# Patient Record
(demographics unavailable — no encounter records)

---

## 2024-10-23 NOTE — ASSESSMENT
[FreeTextEntry1] : Akash is a 6 year-old boy who sustained a left distal radius buckle fracture on 10/8. Today's assessment was performed with the assistance of the patient's parent as an independent historian as the patient's history is unreliable. The radi[place] obtained [place] [facility] [place]24 and the office were reviewed with both the parent and patient confirming a left distal radius buckle fracture. The recommendation at this time would be continuation of removable splint for another 2 weeks then may remove and slowly return to activity   We had a thorough talk in regard to the diagnosis, prognosis and treatment modalities. All questions and concerns were addressed today. There was a verbal understanding from the parents and patient. Patient may follow up in 2 weeks with plan to discontinue splint and plan for fu kirti   I Aleida Duran MD

## 2024-10-23 NOTE — REVIEW OF SYSTEMS
[Fever Above 102] : no fever [Redness] : no redness [Wheezing] : no wheezing [Cough] : no cough [Shortness of Breath] : no shortness of breath [Limping] : no limping [Joint Swelling] : no joint swelling

## 2024-10-23 NOTE — HISTORY OF PRESENT ILLNESS
[FreeTextEntry1] : Akash is a 6 year-old boy who presents for evaluation of left distal radius buckle fracture. He was climbing a tree and was 5 feet in the air when he fell onto his outstretched arm. [place] [facility] [place] where xrays were obtained and he was placed in a splint. Today patient is able to move wrist without pain. He has been playing without issue. Denies numbness or tingling. Denies any new trauma/falls. Still mild TTP at distal radius.

## 2024-10-23 NOTE — PHYSICAL EXAM
[FreeTextEntry1] : Physical Exam    Awake and alert appropriate for their age. The patient has the appropriate coordination and balance noted on the table today for their age.  LUE: removable splint in place on removal, skin intact, no swelling/erythema/ecchymosis  FROM of wrist without pain mild ttp at distal radius  Grossly Neurologically intact with sensation to palpation though exam somewhat limited due to patient age  2+ radial pulse  Cap refill less than 2 seconds compartments soft and compressible.

## 2024-10-23 NOTE — DATA REVIEWED
[de-identified] : My review and interpretation of the radiologic studies: Imaging from [facility] [place] on 10/8/24 reviewed XR of the wrist demonstrating distal radius buckle fracture   Imaging obtained today in the office on 10/22/24 demonstrates healing left distal radius buckle fracture.  Alignment is acceptable in all planes.  No osseous abnormalities.  Findings are within normal limits.

## 2024-10-23 NOTE — DATA REVIEWED
[de-identified] : My review and interpretation of the radiologic studies: Imaging from [facility] [place] on 10/8/24 reviewed XR of the wrist demonstrating distal radius buckle fracture   Imaging obtained today in the office on 10/22/24 demonstrates healing left distal radius buckle fracture.  Alignment is acceptable in all planes.  No osseous abnormalities.  Findings are within normal limits.

## 2024-10-23 NOTE — HISTORY OF PRESENT ILLNESS
[FreeTextEntry1] : Akash is a 6 year-old boy who presents for evaluation of left distal radius buckle fracture. He was climbing a tree and was 5 feet in the air when he fell onto his outstretched arm. He was seen at [facility] [place] on 10/8/24 where xrays were obtained and he was placed in a splint. Today patient is able to move wrist without pain. He has been playing without issue. Denies numbness or tingling. Denies any new trauma/falls. Still mild TTP at distal radius.

## 2024-10-23 NOTE — END OF VISIT
[] : Resident [FreeTextEntry3] : I, Andrae Guardado MD, personally saw and evaluated the patient and developed the plan as documented above. I concur or have edited the note as appropriate.

## 2024-10-23 NOTE — ASSESSMENT
[FreeTextEntry1] : Akash is a 6 year-old boy who sustained a left distal radius buckle fracture on 10/8. Today's assessment was performed with the assistance of the patient's parent as an independent historian as the patient's history is unreliable. The radiographs obtained from [facility] [place] on 10/8/24 and the office were reviewed with both the parent and patient confirming a left distal radius buckle fracture. The recommendation at this time would be continuation of removable splint for another 2 weeks then may remove and slowly return to activity   We had a thorough talk in regard to the diagnosis, prognosis and treatment modalities. All questions and concerns were addressed today. There was a verbal understanding from the parents and patient. Patient may follow up in 2 weeks with plan to discontinue splint and plan for fu kirti   I Aleida Duran MD

## 2024-11-11 NOTE — CONSULT LETTER
[Dear  ___] : Dear  [unfilled], [Consult Letter:] : I had the pleasure of evaluating your patient, [unfilled]. [Please see my note below.] : Please see my note below. [Consult Closing:] : Thank you very much for allowing me to participate in the care of this patient.  If you have any questions, please do not hesitate to contact me. [Sincerely,] : Sincerely, [FreeTextEntry3] : Gilberto Yan MD FAAP FACS Director, [facility] Division of Pediatric General, Thoracic and Endoscopic Surgery [facility]

## 2024-11-11 NOTE — HISTORY OF PRESENT ILLNESS
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 Pino. Post-operatively, he had two episodes of HAEC. He initially demonstrated good control with potty training, however, he began to have nocturnal fecal soiling. It was recommended that he proceed with weekly glycerin/low volume enemas and a daily dose of Senna. He presents today to evaluate his progress.

## 2024-12-16 NOTE — REVIEW OF SYSTEMS
This order has been ordered and printed I have also returned patient's call who states she is at the eye doctor and she will return my call later today.   [Negative] : Genitourinary

## 2024-12-24 NOTE — PHYSICAL EXAM
[NL] : grossly intact [Soft] : soft [Tender] : not tender [Distended] : not distended [TextBox_37] : No palpable stool [TextBox_59] : Rectal exam deferred

## 2024-12-24 NOTE — ASSESSMENT
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 Pino. He is doing well at home and his constipation is well-managed at home with daily Senna. He is keeping accident free during the day and will have an accident at night if he forgets to sit on the toilet. I counseled Akash and his father and reviewed the results of today's AXR, which demonstrated a moderate stool burden within the ascending colon and rectum. I reassured them that Akash is doing well and reviewed the importance of continuing to manage his constipation at home. We discussed good toileting habits for the family to try at home and also discussed the option of establishing care with the pelvic floor physical therapy team to further aid with his stooling techniques. Dad verbalized his understanding and is interested in this process. We provided him with the contact information required to schedule an appointment. Moving forward, I recommended the family remains compliant with the daily Senna and I explained to dad he can titrate the dosage upwards to 15 ml over time. He should also incorporate insoluble fiber into his diet and a high-water intake. Dad indicated his understanding and we have agreed to follow up in six months to check in on Akash's progress. They have my information and know to contact me sooner with any questions or concerns.

## 2024-12-24 NOTE — ASSESSMENT
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 Pino. He is doing well at home and his constipation is well-managed at home with daily Senna. He is keeping accident free during the day and will have an accident at night if he forgets to sit on the toilet. I counseled Akash and his father and reviewed the results of today's AXR, which demonstrated a moderate stool burden within the ascending colon and rectum. I reassured them that Akash is doing well and reviewed the importance of continuing to manage his constipation at home. We discussed good toileting habits for the family to try at home and also discussed the option of establishing care with the pelvic floor physical therapy team to further aid with his stooling techniques. Dad verbalized his understanding and is interested in this process. We provided him with the contact information required to schedule an appointment. Moving forward, I recommended the family remains compliant with the daily Senna and I explained to dad he can titrate the dosage upwards to 15 ml over time. He should also incorporate insoluble fiber into his diet and a high-water intake. Dad indicated his understanding and we have agreed to follow up in six months to check in on Akash's progress. They have my information and know to contact me sooner with any questions or concerns.

## 2024-12-24 NOTE — HISTORY OF PRESENT ILLNESS
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 Pino. Post-operatively, he had two episodes of HAEC. He initially demonstrated good control with potty training, however, he began to have nocturnal fecal soiling. It was recommended that he proceed with weekly glycerin/low volume enemas and a daily dose of Senna. He presents today to evaluate his progress. Dad notes his bowel movements have been well-managed with the 12.5 ml of Senna daily. He is typically keeping accident free during the day and dad notes they will remind him at night to sit on the toilet, where he will pass stool. However, if the parents forget to remind Akash, he will have an accident overnight. They have not treated with any suppositories recently. He is overall doing well.

## 2024-12-24 NOTE — CONSULT LETTER
[FreeTextEntry3] : Gilberto Yan MD FAAP FACS Director, [facility] Division of Pediatric General, Thoracic and Endoscopic Surgery [facility]

## 2024-12-24 NOTE — CONSULT LETTER
[FreeTextEntry3] : Gilberto Yan MD FAAP FACS Director, [facility] Division of Pediatric General, Thoracic and Endoscopic Surgery [facility]

## 2024-12-24 NOTE — CONSULT LETTER
[FreeTextEntry3] : Gilberto Yan MD FAAP FACS Director, [facility] Division of Pediatric General, Thoracic and Endoscopic Surgery [facility]

## 2024-12-24 NOTE — ADDENDUM
[FreeTextEntry1] : Documented by Mendez Chapa acting as a scribe for Dr. Yan on 12/16/2024.   All medical record entries made by the Scribe were at my, Dr. Yan, direction and personally dictated by me on 12/16/2024. I have reviewed the chart and agree that the record accurately reflects my personal performances of the history, physical exam, assessment and plan. I have also personally directed, reviewed, and agree with the instructions.

## 2024-12-24 NOTE — HISTORY OF PRESENT ILLNESS
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 Pino. Post-operatively, he had two episodes of HAEC. He initially demonstrated good control with potty training, however, he began to have nocturnal fecal soiling. It was recommended that he proceed with weekly glycerin/low volume enemas and a daily dose of Senna. He presents today to evaluate his progress. Dad notes his bowel movements have been well-managed with the 12.5 ml of Senna daily. He is typically keeping accident free during the day and dad notes they will remind him at night to sit on the toilet, where he will pass stool. However, if the parents forget to remind Akash, he will have an accident overnight. They have not treated with any suppositories recently. He is overall doing well.

## 2025-06-10 NOTE — REASON FOR VISIT
[Follow-up - Scheduled] : a follow-up, scheduled visit for [Bowel management] : bowel management [Patient] : patient [Mother] : mother [Medical Records] : medical records

## 2025-06-10 NOTE — CONSULT LETTER
[Dear  ___] : Dear  [unfilled], [Courtesy Letter:] : I had the pleasure of seeing your patient, [unfilled], in my office today. [Please see my note below.] : Please see my note below. [Consult Closing:] : Thank you very much for allowing me to participate in the care of this patient.  If you have any questions, please do not hesitate to contact me. [Sincerely,] : Sincerely, [FreeTextEntry3] : Jeannie Johnson  MSN  CPNP Pediatric Nurse Practitioner Department of Pediatric Surgery [facility] phone [phone] fax [phone]

## 2025-06-10 NOTE — HISTORY OF PRESENT ILLNESS
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 Pino. He had two episodes of HAEC. Currently managed with 16 mls of senna, not taking the fiber or doing the low volume enemas anymore.  Taking senna in the am having BM after school and qhs.  Describes as mushy not watery. HE goes through periods where he has full control without accidents.  Other times will soil overnight and refuse to sit on the toilet. Then if he backs up he has poor control.  Last visit Dr Yan recommended pelvic floor PT but their insurance will not cover it.  He presents today after an axr consistent with some stool but not backed up, of note he has not stooled yet today.  He provides input to his stooling pattern.  He is in underwear not needing a pullup @ night.

## 2025-06-10 NOTE — ASSESSMENT
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 Pino. Currently managed with 16 mls of senna daily His  axr before the visit looked good, he was not backed up but needed to stool today.  Counselled the family they can continue his current regimen and make sure he is sitting in correct position and not rushing. They can titrate the senna as needed.  When he feels the cramping, it is usually the, Senna taking effect and he needs to sit on the toilet at that time.  Overnight accidents are from not emptying well and internal sphincter relaxing overnight. This should improve with time and better emptying.  He was very involved withe visit and is aware of his emptying pattern which will be beneficial for him.  Dr Yan was into examine him and discuss a plan going forward.  Less risk for HAEC as he gets older.    plan F/u in 6 months sit on toilet w feet on stool not dangling continue senna daily, titrate as needed